# Patient Record
Sex: FEMALE | Race: WHITE | NOT HISPANIC OR LATINO | Employment: UNEMPLOYED | URBAN - METROPOLITAN AREA
[De-identification: names, ages, dates, MRNs, and addresses within clinical notes are randomized per-mention and may not be internally consistent; named-entity substitution may affect disease eponyms.]

---

## 2019-08-09 ENCOUNTER — TELEPHONE (OUTPATIENT)
Dept: SCHEDULING | Facility: IMAGING CENTER | Age: 19
End: 2019-08-09

## 2019-08-21 ENCOUNTER — OFFICE VISIT (OUTPATIENT)
Dept: MEDICAL GROUP | Facility: CLINIC | Age: 19
End: 2019-08-21
Payer: MEDICAID

## 2019-08-21 VITALS
TEMPERATURE: 98.2 F | OXYGEN SATURATION: 99 % | HEIGHT: 65 IN | DIASTOLIC BLOOD PRESSURE: 70 MMHG | BODY MASS INDEX: 28.99 KG/M2 | HEART RATE: 82 BPM | SYSTOLIC BLOOD PRESSURE: 114 MMHG | WEIGHT: 174 LBS

## 2019-08-21 DIAGNOSIS — M25.512 CHRONIC LEFT SHOULDER PAIN: ICD-10-CM

## 2019-08-21 DIAGNOSIS — G89.29 CHRONIC LEFT SHOULDER PAIN: ICD-10-CM

## 2019-08-21 PROCEDURE — 99203 OFFICE O/P NEW LOW 30 MIN: CPT | Performed by: FAMILY MEDICINE

## 2019-08-21 RX ORDER — DROSPIRENONE AND ETHINYL ESTRADIOL 0.02-3(28)
KIT ORAL
COMMUNITY
Start: 2019-08-06

## 2019-08-21 ASSESSMENT — PATIENT HEALTH QUESTIONNAIRE - PHQ9
SUM OF ALL RESPONSES TO PHQ QUESTIONS 1-9: 2
SUM OF ALL RESPONSES TO PHQ9 QUESTIONS 1 AND 2: 0
8. MOVING OR SPEAKING SO SLOWLY THAT OTHER PEOPLE COULD HAVE NOTICED. OR THE OPPOSITE, BEING SO FIGETY OR RESTLESS THAT YOU HAVE BEEN MOVING AROUND A LOT MORE THAN USUAL: NOT AT ALL
3. TROUBLE FALLING OR STAYING ASLEEP OR SLEEPING TOO MUCH: NOT AT ALL
7. TROUBLE CONCENTRATING ON THINGS, SUCH AS READING THE NEWSPAPER OR WATCHING TELEVISION: NOT AT ALL
1. LITTLE INTEREST OR PLEASURE IN DOING THINGS: NOT AT ALL
9. THOUGHTS THAT YOU WOULD BE BETTER OFF DEAD, OR OF HURTING YOURSELF: NOT AT ALL
4. FEELING TIRED OR HAVING LITTLE ENERGY: SEVERAL DAYS
2. FEELING DOWN, DEPRESSED, IRRITABLE, OR HOPELESS: NOT AT ALL
6. FEELING BAD ABOUT YOURSELF - OR THAT YOU ARE A FAILURE OR HAVE LET YOURSELF OR YOUR FAMILY DOWN: NOT AL ALL
5. POOR APPETITE OR OVEREATING: SEVERAL DAYS

## 2019-08-21 NOTE — ASSESSMENT & PLAN NOTE
Injured left shoulder about 1.5 years ago, when she fell onto shoulder. Since then has had trouble with pain in shoulder when using for long periods. Occasionally feels catching with repetitive movements. Seen initially at [facility], XR done.

## 2019-08-21 NOTE — PROGRESS NOTES
Complaint: Pain in left shoulder     Subjective:     Lupis Beltran is a 18 y.o. female here today for an ongoing issue. New to our clinic.    Chronic left shoulder pain  Injured left shoulder about 1.5 years ago, when she fell onto shoulder. Since then has had trouble with pain in shoulder when using for long periods. Occasionally feels catching with repetitive movements. Seen initially at [facility], XR done.      No other concerns or complaints today.    Current medicines (including changes today)  Current Outpatient Medications   Medication Sig Dispense Refill   • drospirenone-ethinyl estradiol (CINTHIA) 3-0.02 MG per tablet        No current facility-administered medications for this visit.      She  has a past medical history of Chronic left shoulder pain, Depression, History of suicide attempt, and PTSD (post-traumatic stress disorder).    Health Maintenance:       Allergies: Bloodless    Current Outpatient Medications Ordered in Epic   Medication Sig Dispense Refill   • drospirenone-ethinyl estradiol (CINTHIA) 3-0.02 MG per tablet        No current Epic-ordered facility-administered medications on file.        Past Medical History:   Diagnosis Date   • Chronic left shoulder pain    • Depression    • History of suicide attempt    • PTSD (post-traumatic stress disorder)        History reviewed. No pertinent surgical history.    Social History     Tobacco Use   • Smoking status: Never Smoker   • Smokeless tobacco: Never Used   Substance Use Topics   • Alcohol use: Not on file   • Drug use: Not on file       Social History     Social History Narrative   • Not on file       Family History   Problem Relation Age of Onset   • Heart Disease Mother    • Thyroid Mother    • Diabetes Father          ROS Posisvie for achy pain and catching in left shoulder.  Patient denies any fever, chills, unintentional weight gain/loss, fatigue, stroke symptoms, dizziness, headache, nasal congestion, sore-throat, cough, heartburn, chest  "pain, difficulty breathing, abdominal discomfort, diarrhea/constipation, burning with urination or frequency, skin rashes, depression or anxiety.       Objective:     /70 (BP Location: Left arm, Patient Position: Sitting, BP Cuff Size: Adult)   Pulse 82   Temp 36.8 °C (98.2 °F) (Temporal)   Ht 1.651 m (5' 5\")   Wt 78.9 kg (174 lb)   SpO2 99%  Body mass index is 28.96 kg/m².   Physical Exam:  Constitutional: Alert, no distress.  Skin: Warm, dry, good turgor, no rashes in visible areas.  Neck: Trachea midline, no masses, no thyromegaly. No cervical or supraclavicular lymphadenopathy  Respiratory: Unlabored respiratory effort, lungs clear to auscultation, no wheezes, no ronchi.  Cardiovascular: Normal S1, S2, no murmur, no extremity edema.  Left shoulder: FROM, no clicking with mobilization, no weakness upon opposed mobilization.  Psych: Alert and oriented x3, appropriate affect and mood.        Assessment and Plan:     The following treatment plan was discussed    1. Chronic left shoulder pain  Possible glenoid tear.  Told to take Tylenol or ibuprofen for pain  Prn.  - REFERRAL TO ORTHOPEDICS      Followup: Return if symptoms worsen or fail to improve.    Please note that this dictation was created using voice recognition software. I have made every reasonable attempt to correct obvious errors, but I expect that there are errors of grammar and possibly content that I did not discover before finalizing the note.           "